# Patient Record
Sex: FEMALE | Race: WHITE
[De-identification: names, ages, dates, MRNs, and addresses within clinical notes are randomized per-mention and may not be internally consistent; named-entity substitution may affect disease eponyms.]

---

## 2017-07-03 ENCOUNTER — HOSPITAL ENCOUNTER (EMERGENCY)
Dept: HOSPITAL 63 - ER | Age: 32
Discharge: HOME | End: 2017-07-03
Payer: COMMERCIAL

## 2017-07-03 VITALS
DIASTOLIC BLOOD PRESSURE: 57 MMHG | SYSTOLIC BLOOD PRESSURE: 132 MMHG | DIASTOLIC BLOOD PRESSURE: 57 MMHG | DIASTOLIC BLOOD PRESSURE: 57 MMHG | DIASTOLIC BLOOD PRESSURE: 57 MMHG | SYSTOLIC BLOOD PRESSURE: 132 MMHG | SYSTOLIC BLOOD PRESSURE: 132 MMHG | SYSTOLIC BLOOD PRESSURE: 132 MMHG

## 2017-07-03 VITALS — BODY MASS INDEX: 27.18 KG/M2 | WEIGHT: 147.71 LBS | HEIGHT: 62 IN

## 2017-07-03 DIAGNOSIS — N83.202: Primary | ICD-10-CM

## 2017-07-03 DIAGNOSIS — Z88.0: ICD-10-CM

## 2017-07-03 DIAGNOSIS — Z88.1: ICD-10-CM

## 2017-07-03 LAB
ALBUMIN SERPL-MCNC: 3.3 G/DL (ref 3.4–5)
ALBUMIN/GLOB SERPL: 0.9 {RATIO} (ref 1–1.7)
ALP SERPL-CCNC: 83 U/L (ref 46–116)
ALT SERPL-CCNC: 35 U/L (ref 14–59)
AMPHETAMINE/METHAMPHETAMINE: (no result)
ANION GAP SERPL CALC-SCNC: 9 MMOL/L (ref 6–14)
APTT PPP: YELLOW S
AST SERPL-CCNC: 24 U/L (ref 15–37)
BACTERIA #/AREA URNS HPF: (no result) /HPF
BARBITURATES UR-MCNC: (no result) UG/ML
BASOPHILS # BLD AUTO: 0.1 X10^3/UL (ref 0–0.2)
BASOPHILS NFR BLD: 1 % (ref 0–3)
BENZODIAZ UR-MCNC: (no result) UG/L
BILIRUB SERPL-MCNC: 0.2 MG/DL (ref 0.2–1)
BILIRUB UR QL STRIP: (no result)
BUN/CREAT SERPL: 10 (ref 6–20)
CA-I SERPL ISE-MCNC: 10 MG/DL (ref 7–20)
CALCIUM SERPL-MCNC: 8.6 MG/DL (ref 8.5–10.1)
CANNABINOIDS UR-MCNC: (no result) UG/L
CHLORIDE SERPL-SCNC: 106 MMOL/L (ref 98–107)
CO2 SERPL-SCNC: 27 MMOL/L (ref 21–32)
COCAINE UR-MCNC: (no result) NG/ML
CREAT SERPL-MCNC: 1 MG/DL (ref 0.6–1)
EOSINOPHIL NFR BLD: 0.1 X10^3/UL (ref 0–0.7)
EOSINOPHIL NFR BLD: 1 % (ref 0–3)
ERYTHROCYTE [DISTWIDTH] IN BLOOD BY AUTOMATED COUNT: 13.6 % (ref 11.5–14.5)
FIBRINOGEN PPP-MCNC: (no result) MG/DL
GFR SERPLBLD BASED ON 1.73 SQ M-ARVRAT: 64.7 ML/MIN
GLOBULIN SER-MCNC: 3.5 G/DL (ref 2.2–3.8)
GLUCOSE SERPL-MCNC: 106 MG/DL (ref 70–99)
GLUCOSE UR STRIP-MCNC: (no result) MG/DL
HCT VFR BLD CALC: 39.4 % (ref 36–47)
HGB BLD-MCNC: 13.6 G/DL (ref 12–15.5)
LIPASE: 122 U/L (ref 73–393)
LYMPHOCYTES # BLD: 2 X10^3/UL (ref 1–4.8)
LYMPHOCYTES NFR BLD AUTO: 19 % (ref 24–48)
MCH RBC QN AUTO: 29 PG (ref 25–35)
MCHC RBC AUTO-ENTMCNC: 35 G/DL (ref 31–37)
MCV RBC AUTO: 85 FL (ref 79–100)
METHADONE SERPL-MCNC: (no result) NG/ML
MONO #: 0.7 X10^3/UL (ref 0–1.1)
MONOCYTES NFR BLD: 6 % (ref 0–9)
NEUT #: 7.8 X10^3UL (ref 1.8–7.7)
NEUTROPHILS NFR BLD AUTO: 73 % (ref 31–73)
NITRITE UR QL STRIP: (no result)
OPIATES UR-MCNC: (no result) NG/ML
PCP SERPL-MCNC: (no result) MG/DL
PLATELET # BLD AUTO: 180 X10^3/UL (ref 140–400)
POTASSIUM SERPL-SCNC: 3.8 MMOL/L (ref 3.5–5.1)
PROT SERPL-MCNC: 6.8 G/DL (ref 6.4–8.2)
RBC # BLD AUTO: 4.64 X10^6/UL (ref 3.5–5.4)
RBC #/AREA URNS HPF: 0 /HPF (ref 0–2)
SODIUM SERPL-SCNC: 142 MMOL/L (ref 136–145)
SP GR UR STRIP: >=1.03
SQUAMOUS #/AREA URNS LPF: (no result) /LPF
UROBILINOGEN UR-MCNC: 1 MG/DL
WBC # BLD AUTO: 10.7 X10^3/UL (ref 4–11)
WBC #/AREA URNS HPF: (no result) /HPF (ref 0–4)

## 2017-07-03 PROCEDURE — 81025 URINE PREGNANCY TEST: CPT

## 2017-07-03 PROCEDURE — 80305 DRUG TEST PRSMV DIR OPT OBS: CPT

## 2017-07-03 PROCEDURE — 87491 CHLMYD TRACH DNA AMP PROBE: CPT

## 2017-07-03 PROCEDURE — 96376 TX/PRO/DX INJ SAME DRUG ADON: CPT

## 2017-07-03 PROCEDURE — 36415 COLL VENOUS BLD VENIPUNCTURE: CPT

## 2017-07-03 PROCEDURE — 76856 US EXAM PELVIC COMPLETE: CPT

## 2017-07-03 PROCEDURE — 74177 CT ABD & PELVIS W/CONTRAST: CPT

## 2017-07-03 PROCEDURE — 76830 TRANSVAGINAL US NON-OB: CPT

## 2017-07-03 PROCEDURE — 96361 HYDRATE IV INFUSION ADD-ON: CPT

## 2017-07-03 PROCEDURE — 96374 THER/PROPH/DIAG INJ IV PUSH: CPT

## 2017-07-03 PROCEDURE — 96375 TX/PRO/DX INJ NEW DRUG ADDON: CPT

## 2017-07-03 PROCEDURE — 83690 ASSAY OF LIPASE: CPT

## 2017-07-03 PROCEDURE — 87591 N.GONORRHOEAE DNA AMP PROB: CPT

## 2017-07-03 PROCEDURE — 80320 DRUG SCREEN QUANTALCOHOLS: CPT

## 2017-07-03 PROCEDURE — 85027 COMPLETE CBC AUTOMATED: CPT

## 2017-07-03 PROCEDURE — 81001 URINALYSIS AUTO W/SCOPE: CPT

## 2017-07-03 PROCEDURE — 80053 COMPREHEN METABOLIC PANEL: CPT

## 2017-07-03 PROCEDURE — 99285 EMERGENCY DEPT VISIT HI MDM: CPT

## 2017-07-03 RX ADMIN — FENTANYL CITRATE PRN MCG: 50 INJECTION, SOLUTION INTRAMUSCULAR; INTRAVENOUS at 02:16

## 2017-07-03 RX ADMIN — FENTANYL CITRATE PRN MCG: 50 INJECTION, SOLUTION INTRAMUSCULAR; INTRAVENOUS at 04:35

## 2017-07-03 NOTE — ED.ADGEN
Past History


Past Medical History:  No Pertinent History


Past Surgical History:  No Surgical History


Smoking:  Non-smoker


Alcohol Use:  None


Drug Use:  None





Adult General


HPI


HPI





Patient is a 31-year-old woman, no significant past no history, who presents to 

the emergency department with a complaint of lower abdominal and pelvic pain. 

Patient states pain began about an hour ago, she states that she had had 

intercourse with her  area patient states that she initially believed 

that her menses were about began, as she was experiencing abdominal cramping. 

She states that she attempted to have a bowel movement, and the pain became 

much more intense. She did not have a bowel movement at that time, states she 

did become nauseous. She denies any discharge or drainage from the vagina, any 

bleeding or injury, states that she may have had similar symptoms previously, 

when she experienced a urinary tract infection, states she is having cramping 

from her abdomen extending into her back at times. She denies any surgeries on 

her abdomen, denies any fevers or chills, states that she had one episode of 

nausea and vomiting. Has not taken any medications prior to come to the ED. Her 

last bowel movement was around 5:30 this evening, and was normal. No fevers, no 

chills, no sick contacts or exposures, no weakness, numbness, tingling, rashes, 

swelling extremities or other complaints. She states that when she uses the 

restroom in the emergency department, she did have some mild discomfort with 

urination.





Review of Systems


Review of Systems





Constitutional: Denies fever or chills []


Eyes: Denies change in visual acuity, redness, or eye pain []


HENT: Denies nasal congestion or sore throat []


Respiratory: Denies cough or shortness of breath []


Cardiovascular: No additional information not addressed in HPI []


GI: Lower abdominal pain, sustained 1 episode of nausea and vomiting, no bloody 

stools or diarrhea.


: Dysuria, no hematuria.


Musculoskeletal: Denies back pain or joint pain []


Integument: Denies rash or skin lesions []


Neurologic: Denies headache, focal weakness or sensory changes []


Endocrine: Denies polyuria or polydipsia []





Current Medications


Current Medications





Current Medications








 Medications


  (Trade)  Dose


 Ordered  Sig/Eve  Start Time


 Stop Time Status Last Admin


Dose Admin


 


 Fentanyl Citrate


  (Fentanyl 2ml


 Vial)  25 mcg  PRN Q15MIN  PRN  7/3/17 02:00


 7/4/17 01:59  7/3/17 04:35


25 MCG


 


 Info


  (Do NOT chart on


 this entry -- for


 MONITORING)  1 each  PRN DAILY  PRN  7/3/17 02:00


 7/5/17 01:59   


 


 


 Iohexol


  (Omnipaque 300


 Mg/ml)  75 ml  1X  ONCE  7/3/17 02:00


 7/3/17 02:01 DC 7/3/17 02:16


75 ML


 


 Ondansetron HCl


  (Zofran)  4 mg  1X  ONCE  7/3/17 02:00


 7/3/17 02:01 DC 7/3/17 02:00


4 MG


 


 Oxycodone/


 Acetaminophen


  (Percocet 7.5/


 325)  1 tab  1X  ONCE  7/3/17 05:30


 7/3/17 05:31 DC  


 


 


 Sodium Chloride  1,000 ml @ 


 1,000 mls/hr  1X  ONCE  7/3/17 02:00


 7/3/17 02:59 DC 7/3/17 02:00


1,000 MLS/HR











Allergies


Allergies





Allergies








Coded Allergies Type Severity Reaction Last Updated Verified


 


  Penicillins Allergy Unknown  10/17/16 Yes


 


  amoxicillin Allergy Unknown  10/17/16 Yes











Physical Exam


Physical Exam





Constitutional: Well developed, well nourished, ears uncomfortable, non-toxic 

appearance. []


HENT: Normocephalic, atraumatic, bilateral external ears normal, oropharynx 

moist, no oral exudates, nose normal. []


Eyes: PERRLA, EOMI, conjunctiva normal, no discharge. [] 


Neck: Normal range of motion, no tenderness, supple, no stridor. [] 


Cardiovascular:Heart rate regular rhythm, no murmur , S1, S2, no rubs or 

gallops. []


Lungs & Thorax:  Bilateral breath sounds clear to auscultation , no wheezing, 

rhonchi, rales. No chest wall crepitus or tenderness. []


Abdomen: Bowel sounds normal, soft, tender to palpation along the pelvic region 

and suprapubic region, patient with voluntary guarding, no rebound, no rigidity

, no masses, no pulsatile masses. Pain with motion.


Skin: Warm, dry, no erythema, no rash. [] 


Back: No tenderness, no CVA tenderness. [] 


Extremities: No tenderness, no cyanosis, no clubbing, ROM intact, no edema. [] 


Neurologic: Alert and oriented X 3, normal motor function, normal sensory 

function, no focal deficits noted. []


Psychologic: Affect normal, judgement normal, mood normal. []





Pelvic examination: Normal-appearing external examination, small amount of 

white discharge noted on glove, bimanual examination reveals tenderness with 

cervical motion, no masses palpated, patient has pain with motion throughout 

the pelvic region, left and right. Speculum examination reveals mildly friable 

cervix with a small amount of white discharge.





Current Patient Data


Vital Signs





 Vital Signs








  Date Time  Temp Pulse Resp B/P (MAP) Pulse Ox O2 Delivery O2 Flow Rate FiO2


 


7/3/17 04:45  78  132/57 (82)    


 


7/3/17 04:00     98 Room Air  


 


7/3/17 00:41 97.6  22     








Lab Results





 Laboratory Tests








Test


  7/3/17


00:50 7/3/17


01:18 7/3/17


01:32 7/3/17


02:14


 


Urine Collection Type Unknown     


 


Urine Color Yellow     


 


Urine Clarity Hazy     


 


Urine pH 5.5     


 


Urine Specific Gravity >=1.030     


 


Urine Protein


  Neg


(NEG-TRACE) 


  


  


 


 


Urine Glucose (UA)


  Neg mg/dL


(NEG) 


  


  


 


 


Urine Ketones (Stick)


  Trace mg/dL


(NEG) 


  


  


 


 


Urine Blood Neg (NEG)     


 


Urine Nitrite Neg (NEG)     


 


Urine Bilirubin Neg (NEG)     


 


Urine Urobilinogen Dipstick


  1 mg/dL (0.2


mg/dL) 


  


  


 


 


Urine Leukocyte Esterase Mod (NEG)     


 


Urine RBC 0 /HPF (0-2)     


 


Urine WBC


  Occ /HPF (0-4)


  


  


  


 


 


Urine Squamous Epithelial


Cells Few /LPF  


  


  


  


 


 


Urine Bacteria


  Few /HPF


(0-FEW) 


  


  


 


 


Urine Opiates Screen Neg (NEG)     


 


Urine Methadone Screen Neg (NEG)     


 


Urine Barbiturates Neg (NEG)     


 


Urine Phencyclidine Screen Neg (NEG)     


 


Urine


Amphetamine/Methamphetamine Neg (NEG)  


  


  


  


 


 


Urine Benzodiazepines Screen Neg (NEG)     


 


Urine Cocaine Screen Neg (NEG)     


 


Urine Cannabinoids Screen Neg (NEG)     


 


Urine Ethyl Alcohol Neg (NEG)     


 


POC Urine HCG, Qualitative


  


  hcg negative


(Negative) 


  


 


 


White Blood Count


  


  


  10.7 x10^3/uL


(4.0-11.0) 


 


 


Red Blood Count


  


  


  4.64 x10^6/uL


(3.50-5.40) 


 


 


Hemoglobin


  


  


  13.6 g/dL


(12.0-15.5) 


 


 


Hematocrit


  


  


  39.4 %


(36.0-47.0) 


 


 


Mean Corpuscular Volume


  


  


  85 fL ()


  


 


 


Mean Corpuscular Hemoglobin   29 pg (25-35)   


 


Mean Corpuscular Hemoglobin


Concent 


  


  35 g/dL


(31-37) 


 


 


Red Cell Distribution Width


  


  


  13.6 %


(11.5-14.5) 


 


 


Platelet Count


  


  


  180 x10^3/uL


(140-400) 


 


 


Neutrophils (%) (Auto)   73 % (31-73)   


 


Lymphocytes (%) (Auto)   19 % (24-48)  L 


 


Monocytes (%) (Auto)   6 % (0-9)   


 


Eosinophils (%) (Auto)   1 % (0-3)   


 


Basophils (%) (Auto)   1 % (0-3)   


 


Neutrophils # (Auto)


  


  


  7.8 x10^3uL


(1.8-7.7)  H 


 


 


Lymphocytes # (Auto)


  


  


  2.0 x10^3/uL


(1.0-4.8) 


 


 


Monocytes # (Auto)


  


  


  0.7 x10^3/uL


(0.0-1.1) 


 


 


Eosinophils # (Auto)


  


  


  0.1 x10^3/uL


(0.0-0.7) 


 


 


Basophils # (Auto)


  


  


  0.1 x10^3/uL


(0.0-0.2) 


 


 


Sodium Level


  


  


  


  142 mmol/L


(136-145)


 


Potassium Level


  


  


  


  3.8 mmol/L


(3.5-5.1)


 


Chloride Level


  


  


  


  106 mmol/L


()


 


Carbon Dioxide Level


  


  


  


  27 mmol/L


(21-32)


 


Anion Gap    9 (6-14)  


 


Blood Urea Nitrogen


  


  


  


  10 mg/dL


(7-20)


 


Creatinine


  


  


  


  1.0 mg/dL


(0.6-1.0)


 


Estimated GFR


(Cockcroft-Gault) 


  


  


  64.7  


 


 


BUN/Creatinine Ratio    10 (6-20)  


 


Glucose Level


  


  


  


  106 mg/dL


(70-99)  H


 


Calcium Level


  


  


  


  8.6 mg/dL


(8.5-10.1)


 


Total Bilirubin


  


  


  


  0.2 mg/dL


(0.2-1.0)


 


Aspartate Amino Transferase


(AST) 


  


  


  24 U/L (15-37)


 


 


Alanine Aminotransferase (ALT)


  


  


  


  35 U/L (14-59)


 


 


Alkaline Phosphatase


  


  


  


  83 U/L


()


 


Total Protein


  


  


  


  6.8 g/dL


(6.4-8.2)


 


Albumin


  


  


  


  3.3 g/dL


(3.4-5.0)  L


 


Albumin/Globulin Ratio


  


  


  


  0.9 (1.0-1.7)


L


 


Lipase


  


  


  


  122 U/L


()











Microbiology


7/3/17 Wet Prep - Final, Complete





EKG


EKG


Not indicated. []





Radiology/Procedures


Radiology/Procedures


[]





Course & Med Decision Making


Course & Med Decision Making


Pertinent Labs and Imaging studies reviewed. (See chart for details)





Patient denies possibility of sexually-transmitted diseases, noted to have 

tenderness with motion of the cervix, but also with pain to palpation in the 

surrounding pelvic region. No masses palpated. Due to degree of patient's pain, 

concern for peritonitis, and location of symptoms, after discussion at bedside 

will proceed with laboratory studies and imaging of the abdomen with a CT. 

Patient receiving IV fluids, antiemetics, and pain medication. CT abdomen and 

pelvis obtained, reveals evidence of a 3.7 cm left-sided hemorrhagic ovarian 

cyst with a small amount of free fluid in the pelvis. Ultrasound obtained to 

rule out torsion, revealed good flow bilaterally, cyst approximated be 3.5 cm 

in this interpretation, with same small amount of free fluid in the pelvis. 

Patient's vital signs remained stable in the emergency department, blood 

pressure 120s over 70s and 80s, heart rate in the 70s and 80s, orthostatics are 

negative. She has had no further episodes of emesis, and pain is well 

controlled after receiving several doses of fentanyl. I did discuss findings 

with patient, her laboratory studies are unremarkable, and her examination is 

consistent with hemorrhagic ovarian cyst, without evidence of concerning 

bleeding or other concerning findings. Patient states that she is feeling 

better and would like to go home at this time. She does have an OB/GYN, Dr. Mcfarland with she can follow. I did discuss findings as above with Dr. To OB/

GYN, as patient's symptoms are controlled this time, he recommends follow-up 

with her OB/GYN in the clinic, and with him if she experiences difficulty in 

establishing follow-up care. Patient is agreeable this plan, she is ambulating 

without difficulty in the ED, has received oral dose of Percocet, in addition 

to the fentanyl, with normal orthostatics, given clear and detailed return 

instructions, follow-up instructions, medication precautions, and prescription 

for Percocet, 5/325 mg, #15, and Zofran No. 12. Patient voiced understanding 

and agreement with plan, , precautions, and instructions as stated, discharged 

home with family in stable condition with plan as above.





Final Impression


Final Impression


[]


Problems:  





Dragon Disclaimer


Dragon Disclaimer


This electronic medical record was generated, in whole or in part, using a 

voice recognition dictation system.





Departure:


Impression:  


 Primary Impression:  


 Hemorrhagic cyst of left ovary


Disposition:  01 HOME, SELF-CARE


Condition:  IMPROVED


Scripts


Oxycodone Hcl/Acetaminophen (PERCOCET 5-325 MG TABLET) 1 Each Tablet


1-2 TAB PO PRN Q6HRS Y for PAIN, #15 TAB


   Prov: FABIANA SWANN DO         7/3/17 


Ondansetron Hcl (ZOFRAN) 4 Mg Tablet


4 MG PO PRN Q8HRS Y for NAUSEA, #12


   Prov: FABIANA SWANN DO         7/3/17











FABIANA SWANN DO Jul 3, 2017 01:14

## 2017-07-03 NOTE — RAD
Pelvic ultrasound to include transabdominal and transvaginal imaging 

7/3/2017

 

CLINICAL HISTORY: Left ovarian cyst seen on CT scan.

 

TECHNIQUE: Using the distended urinary bladder as a sonographic window, a 

real-time ultrasound examination of the pelvis was performed. Additionally

in an better evaluate the uterus and adnexa, a transvaginal ultrasound 

study was performed. Multiple images were obtained.

 

FINDINGS: Comparison is made to the patient's CT scan of the abdomen and 

pelvis performed earlier today.

 

The uterus is within normal limits in size and echogenicity. It measures 

7.4 x 4.5 x 4.4 cm in longitudinal, transverse, and AP dimensions. The 

endometrial echo complex measures 9 mm in thickness which is within normal

limits. No focal abnormality of the uterus is seen.

 

The right ovary is normal in size and echogenicity. It measures 3.0 x 1.9 

x 1.7 cm in size. The left ovary measures 4.0 x 3.0 x 2.8 cm in size. 

Within the left ovary an oval-shaped anechoic structure is seen with 

internal echogenicity which measures 3.5 cm in size. This likely 

represents a hemorrhagic cyst. This corresponds to the abnormality seen on

the patient's CT scan. A small amount of free fluid is seen within the 

pelvic cul-de-sac.

 

IMPRESSION: 3.5 cm probable hemorrhagic cyst is seen involving the left 

ovary. Small amount of free fluid is seen within the pelvis.

 

Electronically signed by: Oscar Szymanski MD (7/3/2017 4:40 AM)

## 2017-07-03 NOTE — RAD
CT scan of the abdomen and pelvis with contrast 7/3/2017

 

CLINICAL HISTORY: Lower abdominal pain with nausea and vomiting since 

earlier today.

 

TECHNIQUE: After the intravenous administration of 75 cc of Omnipaque 300,

contiguous, 5 mm axial sections were obtained through abdomen and pelvis.

 

 

Exposure: One or more of the following individualized dose reduction 

techniques were utilized for this examination:  

1. Automated exposure control  

2. Adjustment of the mA and/or kV according to patient size  

3. Use of iterative reconstruction technique

 

FINDINGS: Images through the lung bases are within normal limits.

 

The liver, spleen, pancreas, adrenal glands and kidneys are within normal 

limits.

 

The abdominal aorta tapers normally. The gallbladder appears to be 

contracted. No free fluid or free air is seen within the abdomen. There is

no evidence of bowel obstruction. Air and stool is seen throughout the 

colon. The appendix is well-visualized and is within normal limits.

 

Images through the pelvis demonstrate the urinary bladder distended with 

urine. A 3.7 cm oval-shaped low-attenuation structure is seen in the left 

adnexa which likely represents a left ovarian cyst. Small amount of free 

fluid is seen within the pelvis. Minimal S-shaped curvature of the 

thoracolumbar spine is seen.

 

IMPRESSION: 3.7 cm probable left ovarian cyst. Small amount of free fluid 

is seen within the pelvis.

 

Electronically signed by: Oscar Szymanski MD (7/3/2017 2:45 AM)

## 2018-03-15 ENCOUNTER — HOSPITAL ENCOUNTER (OUTPATIENT)
Dept: HOSPITAL 61 - SURG | Age: 33
Discharge: HOME | End: 2018-03-15
Attending: INTERNAL MEDICINE
Payer: COMMERCIAL

## 2018-03-15 DIAGNOSIS — D13.0: Primary | ICD-10-CM

## 2018-03-15 DIAGNOSIS — F41.9: ICD-10-CM

## 2018-03-15 DIAGNOSIS — J45.909: ICD-10-CM

## 2018-03-15 DIAGNOSIS — K29.50: ICD-10-CM

## 2018-03-15 DIAGNOSIS — K21.9: ICD-10-CM

## 2018-03-15 DIAGNOSIS — F32.9: ICD-10-CM

## 2018-03-15 DIAGNOSIS — Z88.0: ICD-10-CM

## 2018-03-15 LAB
NEG OBC UR: (no result)
POS OBC UR: (no result)
U PREG PATIENT: NEGATIVE

## 2018-03-15 PROCEDURE — 43239 EGD BIOPSY SINGLE/MULTIPLE: CPT

## 2018-03-15 PROCEDURE — 81025 URINE PREGNANCY TEST: CPT

## 2018-03-15 PROCEDURE — 88305 TISSUE EXAM BY PATHOLOGIST: CPT

## 2018-03-15 RX ADMIN — SODIUM CHLORIDE, SODIUM LACTATE, POTASSIUM CHLORIDE, AND CALCIUM CHLORIDE 1 MLS/HR: .6; .31; .03; .02 INJECTION, SOLUTION INTRAVENOUS at 11:27

## 2018-03-29 ENCOUNTER — HOSPITAL ENCOUNTER (OUTPATIENT)
Dept: HOSPITAL 63 - US | Age: 33
Discharge: HOME | End: 2018-03-29
Payer: COMMERCIAL

## 2018-03-29 VITALS — WEIGHT: 140 LBS | BODY MASS INDEX: 25.76 KG/M2 | HEIGHT: 62 IN

## 2018-03-29 DIAGNOSIS — R11.2: ICD-10-CM

## 2018-03-29 DIAGNOSIS — K21.9: ICD-10-CM

## 2018-03-29 DIAGNOSIS — Z79.2: ICD-10-CM

## 2018-03-29 DIAGNOSIS — R10.13: Primary | ICD-10-CM

## 2018-03-29 DIAGNOSIS — G43.909: ICD-10-CM

## 2018-03-29 PROCEDURE — A9537 TC99M MEBROFENIN: HCPCS

## 2018-03-29 PROCEDURE — 96374 THER/PROPH/DIAG INJ IV PUSH: CPT

## 2018-03-29 PROCEDURE — 76705 ECHO EXAM OF ABDOMEN: CPT

## 2018-03-29 PROCEDURE — 78226 HEPATOBILIARY SYSTEM IMAGING: CPT

## 2018-03-29 PROCEDURE — 96375 TX/PRO/DX INJ NEW DRUG ADDON: CPT

## 2018-03-29 NOTE — RAD
Indication: Epigastric pain with nausea and vomiting for one month.



Technique: Nuclear medicine HIDA scan with infusion of 5.5 mCi of technetium

99m Choletec.  Ejection fraction was calculated after infusion of 1.3 mcg of

cholecystokinin.



Comparison: Ultrasound from 3/29 chest 2018



Findings:

Gallbladder is visualized at 15 minutes with retrograde filling.  Duodenum is

visualized at 20 minutes.  Ejection fraction calculated after infusion of 1.3

mcg of cholecystokinin is 54.2%.



Impression:

1.  No evidence of cystic duct obstruction.

2.  Normal gallbladder ejection fraction of 54%.

## 2018-03-29 NOTE — RAD
Right upper quadrant ultrasound 3/29/2018



Indication: Right upper quadrant pain, nausea.



Comparison study: None



Discussion: Ultrasound evaluation of the right upper quadrant was performed. 

Static images were submitted to PACS.  Visualized portions of pancreas are

unremarkable.  Visualized portions of aorta and IVC are within normal limits. 

The liver is normal in echotexture.  Liver is normal in size measuring 13.2 cm

longitudinally.  No focal hepatic lesions are identified.  No intrahepatic

biliary dilatation is identified.  The gallbladder is partially decompressed. 

No significant wall thickening, stones, or sludge is seen.  The common bile

duct is nondilated at 3 mm.  The right kidney is normal appearance measuring 9

cm in length.



Impression: Unremarkable sonographic appearance of the right upper quadrant

## 2018-04-04 ENCOUNTER — HOSPITAL ENCOUNTER (OUTPATIENT)
Dept: HOSPITAL 63 - LAB | Age: 33
Discharge: HOME | End: 2018-04-04
Payer: COMMERCIAL

## 2018-04-04 DIAGNOSIS — R10.13: Primary | ICD-10-CM

## 2018-04-04 DIAGNOSIS — R11.2: ICD-10-CM

## 2018-04-04 DIAGNOSIS — Z79.2: ICD-10-CM

## 2018-04-04 LAB
ALBUMIN SERPL-MCNC: 4 G/DL (ref 3.4–5)
ALBUMIN/GLOB SERPL: 1.1 {RATIO} (ref 1–1.7)
ALP SERPL-CCNC: 86 U/L (ref 46–116)
ALT SERPL-CCNC: 34 U/L (ref 14–59)
AMYLASE SERPL-CCNC: 61 U/L (ref 25–115)
ANION GAP SERPL CALC-SCNC: 7 MMOL/L (ref 6–14)
AST SERPL-CCNC: 25 U/L (ref 15–37)
BILIRUB SERPL-MCNC: 0.4 MG/DL (ref 0.2–1)
BUN/CREAT SERPL: 12 (ref 6–20)
CA-I SERPL ISE-MCNC: 12 MG/DL (ref 7–20)
CALCIUM SERPL-MCNC: 9.4 MG/DL (ref 8.5–10.1)
CHLORIDE SERPL-SCNC: 104 MMOL/L (ref 98–107)
CO2 SERPL-SCNC: 29 MMOL/L (ref 21–32)
CREAT SERPL-MCNC: 1 MG/DL (ref 0.6–1)
GFR SERPLBLD BASED ON 1.73 SQ M-ARVRAT: 64.3 ML/MIN
GLOBULIN SER-MCNC: 3.8 G/DL (ref 2.2–3.8)
GLUCOSE SERPL-MCNC: 90 MG/DL (ref 70–99)
LIPASE: 136 U/L (ref 73–393)
POTASSIUM SERPL-SCNC: 4.4 MMOL/L (ref 3.5–5.1)
PROT SERPL-MCNC: 7.8 G/DL (ref 6.4–8.2)
SODIUM SERPL-SCNC: 140 MMOL/L (ref 136–145)
T4 FREE SERPL-MCNC: 1.03 NG/DL (ref 0.76–1.46)
THYROID STIM HORMONE (TSH): 1.13 UIU/ML (ref 0.36–3.74)

## 2018-04-04 PROCEDURE — 84439 ASSAY OF FREE THYROXINE: CPT

## 2018-04-04 PROCEDURE — 80053 COMPREHEN METABOLIC PANEL: CPT

## 2018-04-04 PROCEDURE — 36415 COLL VENOUS BLD VENIPUNCTURE: CPT

## 2018-04-04 PROCEDURE — 82150 ASSAY OF AMYLASE: CPT

## 2018-04-04 PROCEDURE — 83690 ASSAY OF LIPASE: CPT

## 2018-04-04 PROCEDURE — 84443 ASSAY THYROID STIM HORMONE: CPT

## 2018-04-10 ENCOUNTER — HOSPITAL ENCOUNTER (OUTPATIENT)
Dept: HOSPITAL 63 - NM | Age: 33
Discharge: HOME | End: 2018-04-10
Payer: COMMERCIAL

## 2018-04-10 DIAGNOSIS — K30: Primary | ICD-10-CM

## 2018-04-10 DIAGNOSIS — G43.909: ICD-10-CM

## 2018-04-10 PROCEDURE — A9541 TC99M SULFUR COLLOID: HCPCS

## 2018-04-10 PROCEDURE — 78264 GASTRIC EMPTYING IMG STUDY: CPT

## 2018-04-10 NOTE — RAD
Radionuclide gastric emptying study, 4/10/2018:



History: Abdominal pain



The study was performed utilizing a solid test meal radiolabeled with 2.1 mCi

of technetium 99m sulfur colloid.  The time to half emptying of the test meal

from the patient's stomach was estimated at 324 minutes.  A normal T1/2 is 60

minutes +/- 30 minutes.



IMPRESSION: Moderately delayed gastric emptying

## 2020-07-26 ENCOUNTER — HOSPITAL ENCOUNTER (EMERGENCY)
Dept: HOSPITAL 63 - ER | Age: 35
Discharge: HOME | End: 2020-07-26
Payer: COMMERCIAL

## 2020-07-26 VITALS — SYSTOLIC BLOOD PRESSURE: 132 MMHG | DIASTOLIC BLOOD PRESSURE: 57 MMHG

## 2020-07-26 VITALS — BODY MASS INDEX: 23.12 KG/M2 | WEIGHT: 125.66 LBS | HEIGHT: 62 IN

## 2020-07-26 DIAGNOSIS — N39.0: Primary | ICD-10-CM

## 2020-07-26 DIAGNOSIS — R31.9: ICD-10-CM

## 2020-07-26 DIAGNOSIS — Z88.1: ICD-10-CM

## 2020-07-26 DIAGNOSIS — Z88.0: ICD-10-CM

## 2020-07-26 LAB
APTT PPP: YELLOW S
BACTERIA #/AREA URNS HPF: (no result) /HPF
BILIRUB UR QL STRIP: (no result)
FIBRINOGEN PPP-MCNC: (no result) MG/DL
GLUCOSE UR STRIP-MCNC: (no result) MG/DL
NITRITE UR QL STRIP: (no result)
RBC #/AREA URNS HPF: (no result) /HPF (ref 0–2)
SP GR UR STRIP: 1.02
SQUAMOUS #/AREA URNS LPF: (no result) /LPF
UROBILINOGEN UR-MCNC: 2 MG/DL
WBC #/AREA URNS HPF: (no result) /HPF (ref 0–4)

## 2020-07-26 PROCEDURE — 87086 URINE CULTURE/COLONY COUNT: CPT

## 2020-07-26 PROCEDURE — 99284 EMERGENCY DEPT VISIT MOD MDM: CPT

## 2020-07-26 PROCEDURE — 81001 URINALYSIS AUTO W/SCOPE: CPT

## 2020-07-26 PROCEDURE — 74018 RADEX ABDOMEN 1 VIEW: CPT

## 2020-07-26 NOTE — RAD
Exam: Abdomen one view

 

INDICATION: Constipation

 

TECHNIQUE: Frontal view of the abdomen

 

Comparisons: None

 

FINDINGS:

Large amount stool is noted in the descending and sigmoid colon. No 

dilated loops of bowel to suggest obstruction.

 

No suspicious masses or calcifications.

 

Visualized osseous structures are unremarkable.

 

IMPRESSION:

Large amount stool in the descending and sigmoid colon consistent with 

history of constipation. 

 

Electronically signed by: Tim Tinajero MD (7/26/2020 5:15 PM) [id number]

## 2020-07-26 NOTE — PHYS DOC
Past History


Past Medical History:  No Pertinent History, Ovarian Cyst


Past Surgical History:  No Surgical History


Smoking:  Non-smoker


Alcohol Use:  Rarely


Drug Use:  None





General Adult


EDM:


Chief Complaint:  BLOOD IN URINE





HPI:


HPI:





Patient is a 34-year-old female who presents with 24 to 36-hour history of lower

abdominal pain, dysuria and constipation.  She denies any fever chills or 

sweats.  She denies any nausea or vomiting.  She said no cough or congestion.  

She denies any back or flank pain.  []





Review of Systems:


Review of Systems:


Constitutional:  Denies fever or chills 


Eyes:  Denies change in visual acuity 


HENT:  Denies nasal congestion or sore throat 


Respiratory:  Denies cough or shortness of breath 


Cardiovascular:  Denies chest pain or edema 


GI: Reports abdominal pain and constipation


: Per HPI


Musculoskeletal:  Denies back pain or joint pain 


Integument:  Denies rash 


Neurologic:  Denies headache, focal weakness or sensory changes 


Endocrine:  Denies polyuria or polydipsia 


Lymphatic:  Denies swollen glands 


Psychiatric:  Denies depression or anxiety





Heart Score:


Risk Factors:


Risk Factors:  DM, Current or recent (<one month) smoker, HTN, HLP, family 

history of CAD, obesity.


Risk Scores:


Score 0 - 3:  2.5% MACE over next 6 weeks - Discharge Home


Score 4 - 6:  20.3% MACE over next 6 weeks - Admit for Clinical Observation


Score 7 - 10:  72.7% MACE over next 6 weeks - Early Invasive Strategies





Current Medications:


Current Meds:





Current Medications








 Medications


  (Trade)  Dose


 Ordered  Sig/Eve  Start Time


 Stop Time Status Last Admin


Dose Admin


 


 Magnesium Citrate


  (Citroma)  296 ml  1X  ONCE  7/26/20 17:00


 7/26/20 17:01 DC  














Allergies:


Allergies:





Allergies








Coded Allergies Type Severity Reaction Last Updated Verified


 


  Penicillins Allergy Unknown  10/17/16 Yes


 


  amoxicillin Allergy Unknown  10/17/16 Yes











Physical Exam:


PE:





Constitutional: Well developed, well nourished, no acute distress, non-toxic 

appearance. []


HENT: Normocephalic, atraumatic, bilateral external ears normal, oropharynx 

moist, no oral exudates, nose normal. []


Eyes: PERRLA, EOMI, conjunctiva normal, no discharge. [] 


Neck: Normal range of motion, no tenderness, supple, no stridor. [] 


Cardiovascular:Heart rate regular rhythm, no murmur []


Lungs & Thorax:  Bilateral breath sounds clear to auscultation []


Abdomen: Bowel sounds normal, soft, no tenderness, no masses, no pulsatile 

masses. [] 


Skin: Warm, dry, no erythema, no rash. [] 


Back: No tenderness, no CVA tenderness. [] 


Extremities: No tenderness, no cyanosis, no clubbing, ROM intact, no edema. [] 


Neurologic: Alert and oriented X 3, normal motor function, normal sensory 

function, no focal deficits noted. []


Psychologic: Affect normal, judgement normal, mood normal. []





Current Patient Data:


Vital Signs:





                                   Vital Signs








  Date Time  Temp Pulse Resp B/P (MAP) Pulse Ox O2 Delivery O2 Flow Rate FiO2


 


7/26/20 16:25 98.0 67 16 132/57 (82) 96 Room Air  











EKG:


EKG:


[]





Radiology/Procedures:


Radiology/Procedures:


[]PROCEDURE: KUB





Exam: Abdomen one view


 


INDICATION: Constipation


 


TECHNIQUE: Frontal view of the abdomen


 


Comparisons: None


 


FINDINGS:


Large amount stool is noted in the descending and sigmoid colon. No 


dilated loops of bowel to suggest obstruction.


 


No suspicious masses or calcifications.


 


Visualized osseous structures are unremarkable.


 


IMPRESSION:


Large amount stool in the descending and sigmoid colon consistent with 


history of constipation.





Course & Med Decision Making:


Course & Med Decision Making


Pertinent Labs and Imaging studies reviewed. (See chart for details)





[]





Dragon Disclaimer:


Dragon Disclaimer:


This electronic medical record was generated, in whole or in part, using a voice

 recognition dictation system.





Departure


Departure:


Impression:  


   Primary Impression:  


   UTI (urinary tract infection)


   Qualified Codes:  N39.0 - Urinary tract infection, site not specified; R31.9 

   - Hematuria, unspecified


   Additional Impression:  


   Constipation


   Qualified Codes:  K59.01 - Slow transit constipation


Disposition:  01 HOME/RESIDENCE PRIOR TO ADM


Condition:  STABLE


Referrals:  


PCP,NO (PCP)


Patient Instructions:  Constipation, Adult, Urinary Tract Infection





Additional Instructions:  


Return to the emergency department any new or concerning symptoms


Scripts


Nitrofurantoin Monohyd/M-Cryst (MACROBID 100 MG CAPSULE) 100 Mg Capsule


1 CAP PO BID for UTI, #10 CAP


   Prov: JEOVANY PINTO DO         7/26/20





Justification of Admission:


Justification of Admission:


Justification of Admission Dx:  No











JEOVANY PINTO DO             Jul 26, 2020 17:08